# Patient Record
Sex: MALE | Race: WHITE | Employment: OTHER | URBAN - METROPOLITAN AREA
[De-identification: names, ages, dates, MRNs, and addresses within clinical notes are randomized per-mention and may not be internally consistent; named-entity substitution may affect disease eponyms.]

---

## 2024-01-11 ENCOUNTER — APPOINTMENT (OUTPATIENT)
Dept: GENERAL RADIOLOGY | Age: 59
End: 2024-01-11
Payer: COMMERCIAL

## 2024-01-11 ENCOUNTER — APPOINTMENT (OUTPATIENT)
Dept: CT IMAGING | Age: 59
End: 2024-01-11
Payer: COMMERCIAL

## 2024-01-11 ENCOUNTER — HOSPITAL ENCOUNTER (EMERGENCY)
Age: 59
Discharge: HOME | End: 2024-01-11
Attending: EMERGENCY MEDICINE
Payer: COMMERCIAL

## 2024-01-11 VITALS
HEART RATE: 61 BPM | WEIGHT: 174 LBS | DIASTOLIC BLOOD PRESSURE: 78 MMHG | BODY MASS INDEX: 24.36 KG/M2 | OXYGEN SATURATION: 100 % | SYSTOLIC BLOOD PRESSURE: 121 MMHG | TEMPERATURE: 98.5 F | HEIGHT: 71 IN | RESPIRATION RATE: 25 BRPM
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
## 2024-01-11 DIAGNOSIS — E87.6 HYPOKALEMIA: ICD-10-CM

## 2024-01-11 DIAGNOSIS — R68.89 EPISODES OF DECREASED ATTENTIVENESS: ICD-10-CM

## 2024-01-11 LAB
ALBUMIN SERPL-MCNC: 4.5 G/DL (ref 3.4–5)
ALBUMIN/GLOB SERPL: 1.6 {RATIO} (ref 1.1–2.2)
ALP SERPL-CCNC: 79 U/L (ref 40–129)
ALT SERPL-CCNC: 8 U/L (ref 10–40)
AMPHETAMINES UR QL SCN>1000 NG/ML: ABNORMAL
ANION GAP SERPL CALCULATED.3IONS-SCNC: 11 MMOL/L (ref 3–16)
AST SERPL-CCNC: 19 U/L (ref 15–37)
BARBITURATES UR QL SCN>200 NG/ML: ABNORMAL
BASOPHILS # BLD: 0.1 K/UL (ref 0–0.2)
BASOPHILS NFR BLD: 1 %
BENZODIAZ UR QL SCN>200 NG/ML: POSITIVE
BILIRUB SERPL-MCNC: 0.5 MG/DL (ref 0–1)
BILIRUB UR QL STRIP.AUTO: NEGATIVE
BUN SERPL-MCNC: 22 MG/DL (ref 7–20)
CALCIUM SERPL-MCNC: 9.3 MG/DL (ref 8.3–10.6)
CANNABINOIDS UR QL SCN>50 NG/ML: POSITIVE
CHLORIDE SERPL-SCNC: 102 MMOL/L (ref 99–110)
CLARITY UR: CLEAR
CO2 SERPL-SCNC: 24 MMOL/L (ref 21–32)
COCAINE UR QL SCN: ABNORMAL
COLOR UR: NORMAL
CREAT SERPL-MCNC: 1 MG/DL (ref 0.8–1.3)
DEPRECATED RDW RBC AUTO: 14.4 % (ref 12.4–15.4)
DRUG SCREEN COMMENT UR-IMP: ABNORMAL
EKG ATRIAL RATE: 62 BPM
EKG DIAGNOSIS: NORMAL
EKG P AXIS: 37 DEGREES
EKG P-R INTERVAL: 114 MS
EKG Q-T INTERVAL: 418 MS
EKG QRS DURATION: 94 MS
EKG QTC CALCULATION (BAZETT): 424 MS
EKG R AXIS: 5 DEGREES
EKG T AXIS: 47 DEGREES
EKG VENTRICULAR RATE: 62 BPM
EOSINOPHIL # BLD: 0.1 K/UL (ref 0–0.6)
EOSINOPHIL NFR BLD: 1 %
ETHANOLAMINE SERPL-MCNC: NORMAL MG/DL (ref 0–0.08)
FENTANYL SCREEN, URINE: ABNORMAL
GFR SERPLBLD CREATININE-BSD FMLA CKD-EPI: >60 ML/MIN/{1.73_M2}
GLUCOSE SERPL-MCNC: 109 MG/DL (ref 70–99)
GLUCOSE UR STRIP.AUTO-MCNC: NEGATIVE MG/DL
HCT VFR BLD AUTO: 41.2 % (ref 40.5–52.5)
HGB BLD-MCNC: 13.5 G/DL (ref 13.5–17.5)
HGB UR QL STRIP.AUTO: NEGATIVE
KETONES UR STRIP.AUTO-MCNC: NEGATIVE MG/DL
LEUKOCYTE ESTERASE UR QL STRIP.AUTO: NEGATIVE
LYMPHOCYTES # BLD: 1.6 K/UL (ref 1–5.1)
LYMPHOCYTES NFR BLD: 24.2 %
MAGNESIUM SERPL-MCNC: 1.9 MG/DL (ref 1.8–2.4)
MCH RBC QN AUTO: 30 PG (ref 26–34)
MCHC RBC AUTO-ENTMCNC: 32.7 G/DL (ref 31–36)
MCV RBC AUTO: 91.7 FL (ref 80–100)
METHADONE UR QL SCN>300 NG/ML: ABNORMAL
MONOCYTES # BLD: 0.5 K/UL (ref 0–1.3)
MONOCYTES NFR BLD: 7.9 %
NEUTROPHILS # BLD: 4.2 K/UL (ref 1.7–7.7)
NEUTROPHILS NFR BLD: 65.9 %
NITRITE UR QL STRIP.AUTO: NEGATIVE
OPIATES UR QL SCN>300 NG/ML: ABNORMAL
OXYCODONE UR QL SCN: ABNORMAL
PCP UR QL SCN>25 NG/ML: ABNORMAL
PH UR STRIP.AUTO: 6 [PH] (ref 5–8)
PH UR STRIP: 6 [PH]
PLATELET # BLD AUTO: 236 K/UL (ref 135–450)
PMV BLD AUTO: 7.2 FL (ref 5–10.5)
POTASSIUM SERPL-SCNC: 3.1 MMOL/L (ref 3.5–5.1)
PROT SERPL-MCNC: 7.3 G/DL (ref 6.4–8.2)
PROT UR STRIP.AUTO-MCNC: NEGATIVE MG/DL
RBC # BLD AUTO: 4.49 M/UL (ref 4.2–5.9)
SODIUM SERPL-SCNC: 137 MMOL/L (ref 136–145)
SP GR UR STRIP.AUTO: 1.01 (ref 1–1.03)
TROPONIN, HIGH SENSITIVITY: 12 NG/L (ref 0–22)
UA COMPLETE W REFLEX CULTURE PNL UR: NORMAL
UA DIPSTICK W REFLEX MICRO PNL UR: NORMAL
URN SPEC COLLECT METH UR: NORMAL
UROBILINOGEN UR STRIP-ACNC: 0.2 E.U./DL
WBC # BLD AUTO: 6.4 K/UL (ref 4–11)

## 2024-01-11 PROCEDURE — 70450 CT HEAD/BRAIN W/O DYE: CPT

## 2024-01-11 PROCEDURE — 71045 X-RAY EXAM CHEST 1 VIEW: CPT

## 2024-01-11 PROCEDURE — 81003 URINALYSIS AUTO W/O SCOPE: CPT

## 2024-01-11 PROCEDURE — 83735 ASSAY OF MAGNESIUM: CPT

## 2024-01-11 PROCEDURE — 6370000000 HC RX 637 (ALT 250 FOR IP): Performed by: PHYSICIAN ASSISTANT

## 2024-01-11 PROCEDURE — 80307 DRUG TEST PRSMV CHEM ANLYZR: CPT

## 2024-01-11 PROCEDURE — 6370000000 HC RX 637 (ALT 250 FOR IP): Performed by: EMERGENCY MEDICINE

## 2024-01-11 PROCEDURE — 93005 ELECTROCARDIOGRAM TRACING: CPT | Performed by: PHYSICIAN ASSISTANT

## 2024-01-11 PROCEDURE — 93010 ELECTROCARDIOGRAM REPORT: CPT | Performed by: INTERNAL MEDICINE

## 2024-01-11 PROCEDURE — 84484 ASSAY OF TROPONIN QUANT: CPT

## 2024-01-11 PROCEDURE — 99285 EMERGENCY DEPT VISIT HI MDM: CPT

## 2024-01-11 PROCEDURE — 84443 ASSAY THYROID STIM HORMONE: CPT

## 2024-01-11 PROCEDURE — 2580000003 HC RX 258: Performed by: PHYSICIAN ASSISTANT

## 2024-01-11 PROCEDURE — 36415 COLL VENOUS BLD VENIPUNCTURE: CPT

## 2024-01-11 PROCEDURE — 80053 COMPREHEN METABOLIC PANEL: CPT

## 2024-01-11 PROCEDURE — 85025 COMPLETE CBC W/AUTO DIFF WBC: CPT

## 2024-01-11 PROCEDURE — 82077 ASSAY SPEC XCP UR&BREATH IA: CPT

## 2024-01-11 RX ORDER — DIAZEPAM 5 MG/1
10 TABLET ORAL ONCE
Status: COMPLETED | OUTPATIENT
Start: 2024-01-11 | End: 2024-01-11

## 2024-01-11 RX ORDER — POTASSIUM CHLORIDE 20 MEQ/1
40 TABLET, EXTENDED RELEASE ORAL ONCE
Status: COMPLETED | OUTPATIENT
Start: 2024-01-11 | End: 2024-01-11

## 2024-01-11 RX ORDER — 0.9 % SODIUM CHLORIDE 0.9 %
1000 INTRAVENOUS SOLUTION INTRAVENOUS ONCE
Status: COMPLETED | OUTPATIENT
Start: 2024-01-11 | End: 2024-01-11

## 2024-01-11 RX ADMIN — SODIUM CHLORIDE 1000 ML: 9 INJECTION, SOLUTION INTRAVENOUS at 15:43

## 2024-01-11 RX ADMIN — DIAZEPAM 10 MG: 5 TABLET ORAL at 19:36

## 2024-01-11 RX ADMIN — POTASSIUM CHLORIDE 40 MEQ: 1500 TABLET, EXTENDED RELEASE ORAL at 16:29

## 2024-01-11 ASSESSMENT — PAIN - FUNCTIONAL ASSESSMENT: PAIN_FUNCTIONAL_ASSESSMENT: NONE - DENIES PAIN

## 2024-01-11 NOTE — ED PROVIDER NOTES
THIS IS MY LORETO SUPERVISORY AND SHARED VISIT NOTE:    I personally saw the patient and made/approved the management plan and take responsibility for the patient management.    I independently performed a history and physical on Guillermo Sarabia.   All diagnostic, treatment, and disposition decisions were made by myself in conjunction with the advanced practice provider. I personally saw the patient and performed a substantive portion of the visit including all aspects of the medical decision making.      For further details of Guillermo Sarabia's emergency department encounter, please see MARY Walls's documentation.    History: Patient is a 58-year-old male presenting today due to concern for having a fall 4 days ago where he does not think he lost consciousness but is not for sure although does state the details are somewhat foggy.  He reports falling over a dog whenever it happened.  He has a prior history of seizures from a traumatic brain injury and yesterday he reports his sister who he thinks is his power of  noticed him zoning out and therefore there was concern he may have had a absence seizure yesterday.  He states that his mother could also be his power of  but again his memory is not the best from the TBI.  He denies any chest pain or shortness of breath.  He denies any current headache or neck pain.  When he fell 4 days ago he had his right shoulder and still has some mild right shoulder discomfort although states that is improved.  He denied ever having any headache although did have some right-sided neck pain starting the day after the fall but states his neck discomfort is mostly gone now.  He denies any new numbness or weakness in the arms or legs.  He denies any visual changes.  He did try IcyHot for the shoulder for the pain.  He did speak to his neurologist earlier today but ultimately decided to come to the emergency department to be evaluated.  He denies any zoning out today or  also talking about decreasing some of his medications and this can be continued as an outpatient.  He was denying any headache when I spoke to him.  Again, he seemed to be telling the same story appropriately and was able to recall past events as well and at this point I do not see any significant memory issues and if the patient is declining admission, I cannot hold the patient against his will, but he knows to return to the ED over the next day or 2 if anything changes or worsens but otherwise follow up with PCP or neurology over the next few days for any other concerns.  His right shoulder exam was reassuring and no concern for trauma at this time related to the fall.                        Chance Velasquez MD  01/12/24 [id number]

## 2024-01-11 NOTE — ED PROVIDER NOTES
[facility]  ED  EMERGENCY DEPARTMENT ENCOUNTER        Pt Name: Guillermo Sarabia  MRN: [id number]  Birthdate 1965  Date of evaluation: 1/11/2024  Provider: SASHA LEMUS PA-C  PCP: Bethanie Macias MD  ED Attending: MD Ron       I have seen and evaluated this patient with my supervising physician Chance Velasquez MD.    CHIEF COMPLAINT:     Chief Complaint   Patient presents with    Fall     Pt from home to ED s/p fall. Pt reports falling after tripping and hit shoulder on a tv stand. Pt reports hitting head on window frame and floor. Pt denies taking blood thinners and denies LOC.        HISTORY OF PRESENT ILLNESS:      History provided by the patient. No limitations.    Guillermo Sarabia is a 58 y.o. male who arrives to the ED by EMS from home.  Patient reportedly lives with his 90-year-old mother who has dementia and with his sister who is currently undergoing treatment for breast cancer.  The patient reports he feels completely exhausted having to take care of his family.  He is brought in today due to reports of recent falls.  EMS report a fall that happened today.  The patient reports a fall as well but believes it happened a couple days ago.  There is one report of him tripping and falling when a friend's dog knocked him over.  He fell onto a TV stand and reportedly bumped his head.  There is another reported fall when he was helping his 90-year-old mother in the bathroom and stumbled.  He denies any loss of consciousness.  The patient additionally states his sister was concerned because the patient had few episodes where and he seemed to zone out.  He was not responsive when she called his name.  He was making some movements with his mouth which is something he did remotely when he used to deal with seizures.  He states he has not had a seizure in years and years.  He follows with neurology due to a history of TBI and reportedly had a phone call type appointment with his neurologist  MARY LEMUS-C (electronicallysigned)              Nelson Lemus PA  01/11/24 2056

## 2024-01-11 NOTE — ED NOTES
Straight cath performed using sterile technique. 175ml urine out. Pt tolerated procedure well. Pt has no further concerns at this time.

## 2024-01-11 NOTE — ED NOTES
Upon assessment pt is poor historian with changing story. Pt has delayed responses to medical questions, but admits to previous TBI. No obvious signs of deformity or trauma. Pt placed on cardiac monitor and appears to be normal sinus. Pt denies any pain. Pt informed of plan of care. Pt provided with two warm blankets and educated on the need for a urine sample. Pt denies any further needs at this time. Plan of care ongoing.

## 2024-01-12 LAB — TSH SERPL DL<=0.005 MIU/L-ACNC: 0.6 UIU/ML (ref 0.27–4.2)

## 2024-01-27 ENCOUNTER — HOSPITAL ENCOUNTER (EMERGENCY)
Age: 59
Discharge: ANOTHER HOSPITAL | End: 2024-01-27
Attending: STUDENT IN AN ORGANIZED HEALTH CARE EDUCATION/TRAINING PROGRAM
Payer: COMMERCIAL

## 2024-01-27 ENCOUNTER — APPOINTMENT (OUTPATIENT)
Dept: CT IMAGING | Age: 59
End: 2024-01-27
Payer: COMMERCIAL

## 2024-01-27 VITALS
HEART RATE: 66 BPM | OXYGEN SATURATION: 100 % | SYSTOLIC BLOOD PRESSURE: 124 MMHG | HEIGHT: 72 IN | BODY MASS INDEX: 20.99 KG/M2 | RESPIRATION RATE: 13 BRPM | DIASTOLIC BLOOD PRESSURE: 78 MMHG | WEIGHT: 155 LBS | TEMPERATURE: 97.4 F
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
## 2024-01-27 DIAGNOSIS — J94.8 HYDROPNEUMOTHORAX: ICD-10-CM

## 2024-01-27 DIAGNOSIS — J98.2 PNEUMOMEDIASTINUM (HCC): ICD-10-CM

## 2024-01-27 DIAGNOSIS — S22.32XA CLOSED FRACTURE OF ONE RIB OF LEFT SIDE, INITIAL ENCOUNTER: ICD-10-CM

## 2024-01-27 LAB
ALBUMIN SERPL-MCNC: 3.8 G/DL (ref 3.4–5)
ALBUMIN/GLOB SERPL: 1.6 {RATIO} (ref 1.1–2.2)
ALP SERPL-CCNC: 66 U/L (ref 40–129)
ALT SERPL-CCNC: 7 U/L (ref 10–40)
ANION GAP SERPL CALCULATED.3IONS-SCNC: 8 MMOL/L (ref 3–16)
APTT BLD: 31.5 SEC (ref 22.7–35.9)
AST SERPL-CCNC: 12 U/L (ref 15–37)
BASOPHILS # BLD: 0 K/UL (ref 0–0.2)
BASOPHILS NFR BLD: 0.4 %
BILIRUB SERPL-MCNC: 0.3 MG/DL (ref 0–1)
BUN SERPL-MCNC: 11 MG/DL (ref 7–20)
CALCIUM SERPL-MCNC: 9.1 MG/DL (ref 8.3–10.6)
CHLORIDE SERPL-SCNC: 103 MMOL/L (ref 99–110)
CO2 SERPL-SCNC: 30 MMOL/L (ref 21–32)
CREAT SERPL-MCNC: 0.8 MG/DL (ref 0.8–1.3)
DEPRECATED RDW RBC AUTO: 14 % (ref 12.4–15.4)
EOSINOPHIL # BLD: 0.1 K/UL (ref 0–0.6)
EOSINOPHIL NFR BLD: 1.7 %
GFR SERPLBLD CREATININE-BSD FMLA CKD-EPI: >60 ML/MIN/{1.73_M2}
GLUCOSE SERPL-MCNC: 96 MG/DL (ref 70–99)
HCT VFR BLD AUTO: 39.2 % (ref 40.5–52.5)
HGB BLD-MCNC: 13 G/DL (ref 13.5–17.5)
INR PPP: 1.13 (ref 0.84–1.16)
LYMPHOCYTES # BLD: 1.5 K/UL (ref 1–5.1)
LYMPHOCYTES NFR BLD: 19 %
MAGNESIUM SERPL-MCNC: 2.1 MG/DL (ref 1.8–2.4)
MCH RBC QN AUTO: 30.7 PG (ref 26–34)
MCHC RBC AUTO-ENTMCNC: 33.1 G/DL (ref 31–36)
MCV RBC AUTO: 92.8 FL (ref 80–100)
MONOCYTES # BLD: 0.6 K/UL (ref 0–1.3)
MONOCYTES NFR BLD: 8.2 %
NEUTROPHILS # BLD: 5.6 K/UL (ref 1.7–7.7)
NEUTROPHILS NFR BLD: 70.7 %
PLATELET # BLD AUTO: 197 K/UL (ref 135–450)
PMV BLD AUTO: 7.6 FL (ref 5–10.5)
POTASSIUM SERPL-SCNC: 3.1 MMOL/L (ref 3.5–5.1)
PROT SERPL-MCNC: 6.2 G/DL (ref 6.4–8.2)
PROTHROMBIN TIME: 14.5 SEC (ref 11.5–14.8)
RBC # BLD AUTO: 4.22 M/UL (ref 4.2–5.9)
SODIUM SERPL-SCNC: 141 MMOL/L (ref 136–145)
SPECIMEN STATUS: NORMAL
WBC # BLD AUTO: 7.9 K/UL (ref 4–11)

## 2024-01-27 PROCEDURE — 6370000000 HC RX 637 (ALT 250 FOR IP): Performed by: STUDENT IN AN ORGANIZED HEALTH CARE EDUCATION/TRAINING PROGRAM

## 2024-01-27 PROCEDURE — 71250 CT THORAX DX C-: CPT

## 2024-01-27 PROCEDURE — 96375 TX/PRO/DX INJ NEW DRUG ADDON: CPT

## 2024-01-27 PROCEDURE — 99285 EMERGENCY DEPT VISIT HI MDM: CPT

## 2024-01-27 PROCEDURE — 80053 COMPREHEN METABOLIC PANEL: CPT

## 2024-01-27 PROCEDURE — 83735 ASSAY OF MAGNESIUM: CPT

## 2024-01-27 PROCEDURE — 85610 PROTHROMBIN TIME: CPT

## 2024-01-27 PROCEDURE — 6360000002 HC RX W HCPCS: Performed by: STUDENT IN AN ORGANIZED HEALTH CARE EDUCATION/TRAINING PROGRAM

## 2024-01-27 PROCEDURE — 85025 COMPLETE CBC W/AUTO DIFF WBC: CPT

## 2024-01-27 PROCEDURE — 72125 CT NECK SPINE W/O DYE: CPT

## 2024-01-27 PROCEDURE — 70450 CT HEAD/BRAIN W/O DYE: CPT

## 2024-01-27 PROCEDURE — 96374 THER/PROPH/DIAG INJ IV PUSH: CPT

## 2024-01-27 PROCEDURE — 85730 THROMBOPLASTIN TIME PARTIAL: CPT

## 2024-01-27 RX ORDER — LIDOCAINE 4 G/G
1 PATCH TOPICAL DAILY
Status: DISCONTINUED | OUTPATIENT
Start: 2024-01-27 | End: 2024-01-27 | Stop reason: HOSPADM

## 2024-01-27 RX ORDER — HYDROMORPHONE HYDROCHLORIDE 1 MG/ML
1 INJECTION, SOLUTION INTRAMUSCULAR; INTRAVENOUS; SUBCUTANEOUS ONCE
Status: COMPLETED | OUTPATIENT
Start: 2024-01-27 | End: 2024-01-27

## 2024-01-27 RX ORDER — OXYCODONE HYDROCHLORIDE AND ACETAMINOPHEN 5; 325 MG/1; MG/1
1 TABLET ORAL
Status: COMPLETED | OUTPATIENT
Start: 2024-01-27 | End: 2024-01-27

## 2024-01-27 RX ORDER — ONDANSETRON 2 MG/ML
4 INJECTION INTRAMUSCULAR; INTRAVENOUS ONCE
Status: COMPLETED | OUTPATIENT
Start: 2024-01-27 | End: 2024-01-27

## 2024-01-27 RX ORDER — MIDAZOLAM HYDROCHLORIDE 1 MG/ML
2 INJECTION INTRAMUSCULAR; INTRAVENOUS ONCE
Status: COMPLETED | OUTPATIENT
Start: 2024-01-27 | End: 2024-01-27

## 2024-01-27 RX ADMIN — MIDAZOLAM 2 MG: 1 INJECTION INTRAMUSCULAR; INTRAVENOUS at 10:37

## 2024-01-27 RX ADMIN — ONDANSETRON 4 MG: 2 INJECTION INTRAMUSCULAR; INTRAVENOUS at 09:36

## 2024-01-27 RX ADMIN — HYDROMORPHONE HYDROCHLORIDE 1 MG: 1 INJECTION, SOLUTION INTRAMUSCULAR; INTRAVENOUS; SUBCUTANEOUS at 09:53

## 2024-01-27 RX ADMIN — OXYCODONE HYDROCHLORIDE AND ACETAMINOPHEN 1 TABLET: 5; 325 TABLET ORAL at 08:26

## 2024-01-27 ASSESSMENT — PAIN SCALES - GENERAL
PAINLEVEL_OUTOF10: 10

## 2024-01-27 ASSESSMENT — PAIN - FUNCTIONAL ASSESSMENT: PAIN_FUNCTIONAL_ASSESSMENT: 0-10

## 2024-01-27 ASSESSMENT — PAIN DESCRIPTION - LOCATION: LOCATION: HEAD;RIB CAGE

## 2024-01-27 NOTE — ED NOTES
Report called to [facility] charge RN.  Pt left ED in stable condition with all personal belongings.

## 2024-01-27 NOTE — ED NOTES
TRAUMA TRANSFER TIMELINE  0926-Paged  Capacity Management  0951-Pt Accepted @ [facility] By ED Physician  @ Trauma Surgeon   1010-[facility] to transport- ETA 1037

## 2024-01-27 NOTE — ED PROVIDER NOTES
pathologic lymphadenopathy by size criteria.  No axillary adenopathy. Moderate left hydropneumothorax is seen, with moderate air component and small pleural fluid component.  Emphysema is present.  Biapical pleuroparenchymal thickening.  Patchy atelectasis.  5 mm right apical pulmonary nodule.  Minimally displaced fracture of the lateral left 6th rib demonstrated.  Degenerative change of the thoracic spine.     Head: No acute intracranial process. Cervical spine: Degenerative disc disease, without evidence of spondylolisthesis or gross fracture. Chest: Moderate left hydropneumothorax.  Small amount of pneumomediastinum. Lateral left 6th rib fracture.  Chest tube is recommended. 5 mm right apical pulmonary nodule, indeterminate, though for which CT surveillance is recommended given the underlying emphysema. Findings were discussed with LULY GUERRA at 09:47 on 1/27/2024. RECOMMENDATIONS: Fleischner Society guidelines for follow-up and management of incidentally detected pulmonary nodules: Single Solid Nodule: Nodule size less than 6 mm In a low-risk patient, no routine follow-up. In a high-risk patient, optional CT at 12 months. - Low risk patients include individuals with minimal or absent history of smoking and other known risk factors. - High risk patients include individuals with a history or smoking or known risk factors. Radiology 2017 http://pubs.rsna.org/doi/full/10.1148/radiol.3230456786     CT CERVICAL SPINE WO CONTRAST    Result Date: 1/27/2024  EXAMINATION: CT OF THE HEAD WITHOUT CONTRAST; CT OF THE CHEST WITHOUT CONTRAST; CT OF THE CERVICAL SPINE WITHOUT CONTRAST  1/27/2024 9:09 am; 1/27/2024 9:21 am TECHNIQUE: CT of the head was performed without the administration of intravenous contrast. Automated exposure control, iterative reconstruction, and/or weight based adjustment of the mA/kV was utilized to reduce the radiation dose to as low as reasonably achievable.; CT of the chest was performed  hydropneumothorax, lateral left 6th rib fracture, no deviation   - Lab:   CBC with a normal white blood cell count, normocytic anemia with a hemoglobin of 13, hematocrit 39.2, normal platelets  CMP with normal sodium, low potassium at 3.1, normal renal function, normal LFTs and bilirubin  Normal coagulation studies    Patient was consented for chest tube however he would prefer to wait until he gets to  trauma. Patient understood the risks.   I did speak with  trauma and the emergency department who accepted patient for transfer.  Patient is currently stable.  Again there is no evidence of deviation or acute airway compromise.  He was placed on nonrebreather simply for comfort and CT findings as well as fact chest tube would be delayed. Transport was available and was ready to transfer patient.      - I discussed the results with patient.  We agreed to transfer       Clinical Impression:  1. Fall, initial encounter    2. Hydropneumothorax    3. Pneumomediastinum (HCC)    4. Closed fracture of one rib of left side, initial encounter          Disposition:  Transfer to  emergency department stable condition.    Blood pressure 124/78, pulse 66, temperature 97.4 °F (36.3 °C), temperature source Oral, resp. rate 13, height 1.829 m (6'), weight 70.3 kg (155 lb), SpO2 100 %.    Patient was given scripts for the following medications. I counseled patient how to take these medications.   Discharge Medication List as of 1/27/2024 11:08 AM          Disposition referral (if applicable):  No follow-up provider specified.      Total critical care time is 36 minutes, which excludes separately billable procedures and updating family. Time spent is specifically for management of the presenting complaint and symptoms initially, direct bedside care, reevaluation, review of records, and consultation.  There was a high probability of clinically significant life-threatening deterioration in the patient's condition, which required my

## 2024-01-27 NOTE — ED NOTES
Pt's family called per pt's request prior to his transfer.  Spoke with Karley and updated on pt's transfer.

## 2024-02-04 ENCOUNTER — TELEPHONE (OUTPATIENT)
Dept: CASE MANAGEMENT | Age: 59
End: 2024-02-04

## 2024-02-04 NOTE — TELEPHONE ENCOUNTER
Per imaging report CT Chest 1/27/24 there is an incidental 5 mm lung nodule.  This patient is high risk due to smoking history.  Fleischner Guidelines recommend a repeat CT Chest in 1 year or Lung Screening.  Patient may benefit from a pulmonology referral.    Anisa Mercado  Lung Navigation ADRIAN([email or web address] Health
